# Patient Record
Sex: FEMALE | Race: WHITE | NOT HISPANIC OR LATINO | URBAN - METROPOLITAN AREA
[De-identification: names, ages, dates, MRNs, and addresses within clinical notes are randomized per-mention and may not be internally consistent; named-entity substitution may affect disease eponyms.]

---

## 2023-08-09 ENCOUNTER — TELEPHONE (OUTPATIENT)
Dept: GASTROENTEROLOGY | Facility: CLINIC | Age: 63
End: 2023-08-09
Payer: COMMERCIAL

## 2024-07-22 ENCOUNTER — TELEPHONE (OUTPATIENT)
Dept: GASTROENTEROLOGY | Facility: CLINIC | Age: 64
End: 2024-07-22
Payer: COMMERCIAL

## 2024-07-22 NOTE — TELEPHONE ENCOUNTER
Returned pt call. I explained that [facility] will call on Friday with the arrival. Currently, [facility] does not have anything sooner, but she's been added to wait list. suri

## 2024-07-22 NOTE — TELEPHONE ENCOUNTER
----- Message from Gracie Beauchamp sent at 7/22/2024 10:25 AM CDT -----  Contact: pt  Pt calling wanting to know time she has to be at appt because  has to take off to bring her.  Pt would also like a sooner appt if possible and she can be reached at [phone] or text pt.    Thanks,

## 2024-08-19 ENCOUNTER — OUTSIDE PLACE OF SERVICE (OUTPATIENT)
Dept: GASTROENTEROLOGY | Facility: CLINIC | Age: 64
End: 2024-08-19

## 2024-08-19 LAB — CRC RECOMMENDATION EXT: NORMAL

## 2024-09-06 ENCOUNTER — TELEPHONE (OUTPATIENT)
Dept: GASTROENTEROLOGY | Facility: CLINIC | Age: 64
End: 2024-09-06
Payer: COMMERCIAL

## 2024-09-06 NOTE — TELEPHONE ENCOUNTER
3 TA, repeat colonoscopy in 3 years.     Notify patient that her 3 colon polyps were benign. Repeat colonoscopy in 3 years. Patient's spouse (Dr. Whitfield, [facility] pathologist) notified patient the day or result. Confirm recall tab in appointment desk is up-to-date (update if needed).  NBP

## 2024-09-12 ENCOUNTER — DOCUMENTATION ONLY (OUTPATIENT)
Dept: GASTROENTEROLOGY | Facility: CLINIC | Age: 64
End: 2024-09-12
Payer: COMMERCIAL